# Patient Record
Sex: MALE | Race: WHITE | NOT HISPANIC OR LATINO | URBAN - METROPOLITAN AREA
[De-identification: names, ages, dates, MRNs, and addresses within clinical notes are randomized per-mention and may not be internally consistent; named-entity substitution may affect disease eponyms.]

---

## 2017-01-24 ENCOUNTER — APPOINTMENT (RX ONLY)
Dept: URBAN - METROPOLITAN AREA CLINIC 344 | Facility: CLINIC | Age: 52
Setting detail: DERMATOLOGY
End: 2017-01-24

## 2017-01-24 DIAGNOSIS — L71.0 PERIORAL DERMATITIS: ICD-10-CM

## 2017-01-24 PROCEDURE — 99202 OFFICE O/P NEW SF 15 MIN: CPT

## 2017-01-24 PROCEDURE — ? COUNSELING

## 2017-01-24 PROCEDURE — ? PRESCRIPTION

## 2017-01-24 PROCEDURE — ? TREATMENT REGIMEN

## 2017-01-24 RX ORDER — DOXYCYCLINE 40 MG/1
CAPSULE ORAL
Qty: 30 | Refills: 1 | Status: ERX | COMMUNITY
Start: 2017-01-24

## 2017-01-24 RX ORDER — CLINDAMYCIN PHOSPHATE 10 MG/ML
LOTION TOPICAL
Qty: 1 | Refills: 3 | Status: ERX | COMMUNITY
Start: 2017-01-24

## 2017-01-24 RX ORDER — HYDROCORTISONE 25 MG/ML
LOTION TOPICAL
Qty: 1 | Refills: 0 | Status: ERX | COMMUNITY
Start: 2017-01-24

## 2017-01-24 RX ADMIN — DOXYCYCLINE: 40 CAPSULE ORAL at 18:25

## 2017-01-24 RX ADMIN — HYDROCORTISONE: 25 LOTION TOPICAL at 18:30

## 2017-01-24 RX ADMIN — CLINDAMYCIN PHOSPHATE: 10 LOTION TOPICAL at 18:25

## 2017-01-24 ASSESSMENT — LOCATION ZONE DERM: LOCATION ZONE: FACE

## 2017-01-24 ASSESSMENT — LOCATION DETAILED DESCRIPTION DERM
LOCATION DETAILED: RIGHT INFERIOR MEDIAL BUCCAL CHEEK
LOCATION DETAILED: RIGHT CHIN
LOCATION DETAILED: LEFT SUPERIOR MEDIAL BUCCAL CHEEK

## 2017-01-24 ASSESSMENT — LOCATION SIMPLE DESCRIPTION DERM
LOCATION SIMPLE: RIGHT CHEEK
LOCATION SIMPLE: LEFT CHEEK
LOCATION SIMPLE: CHIN

## 2017-01-24 NOTE — PROCEDURE: TREATMENT REGIMEN
Detail Level: Zone
Samples Given: Cerave foaming face wash and moisturizer
Initiate Treatment: Oracea 40 mg : take one tablet daily for one month\\nClindamycin lotion: apply to affected area once a day\\nHydrocortisone 2.5%: apply to affected area once a day